# Patient Record
Sex: MALE | Race: OTHER | URBAN - METROPOLITAN AREA
[De-identification: names, ages, dates, MRNs, and addresses within clinical notes are randomized per-mention and may not be internally consistent; named-entity substitution may affect disease eponyms.]

---

## 2019-01-28 PROCEDURE — 81003 URINALYSIS AUTO W/O SCOPE: CPT | Performed by: FAMILY MEDICINE

## 2020-01-24 PROBLEM — G56.03 BILATERAL CARPAL TUNNEL SYNDROME: Status: ACTIVE | Noted: 2020-01-24

## 2021-07-20 PROBLEM — F90.9 ADULT ADHD: Status: ACTIVE | Noted: 2021-07-20

## 2021-07-21 PROBLEM — F17.200 SMOKER: Status: ACTIVE | Noted: 2021-07-21

## 2023-09-18 ENCOUNTER — HOSPITAL ENCOUNTER (OUTPATIENT)
Age: 38
Discharge: HOME | End: 2023-09-18
Payer: MEDICAID

## 2023-09-18 VITALS
TEMPERATURE: 98 F | OXYGEN SATURATION: 100 % | RESPIRATION RATE: 18 BRPM | DIASTOLIC BLOOD PRESSURE: 82 MMHG | HEART RATE: 92 BPM | SYSTOLIC BLOOD PRESSURE: 132 MMHG

## 2023-09-18 DIAGNOSIS — R21 PENILE RASH: Primary | ICD-10-CM

## 2023-09-18 PROCEDURE — 99203 OFFICE O/P NEW LOW 30 MIN: CPT | Performed by: PHYSICIAN ASSISTANT

## 2023-09-18 RX ORDER — ACYCLOVIR 800 MG/1
800 TABLET ORAL 4 TIMES DAILY
Qty: 28 TABLET | Refills: 0 | Status: SHIPPED | OUTPATIENT
Start: 2023-09-18 | End: 2023-09-25

## 2023-09-18 NOTE — DISCHARGE INSTRUCTIONS
YOU REQUESTED EMPIRIC COVERAGE FOR HERPES PENDING RESULTS. STOP IF NEGATIVE RESULT VIA MYCHART. READDRESS as Instructed and refrain from sex practices until cleared by a outpatient clinician.

## 2023-09-19 LAB
C TRACH DNA SPEC QL NAA+PROBE: NEGATIVE
HSV1 DNA SPEC QL NAA+PROBE: NEGATIVE
HSV2 DNA SPEC QL NAA+PROBE: NEGATIVE
N GONORRHOEA DNA SPEC QL NAA+PROBE: NEGATIVE

## 2023-10-15 ENCOUNTER — HOSPITAL ENCOUNTER (OUTPATIENT)
Age: 38
Discharge: HOME | End: 2023-10-15
Payer: MEDICAID

## 2023-10-15 VITALS
DIASTOLIC BLOOD PRESSURE: 83 MMHG | HEART RATE: 115 BPM | TEMPERATURE: 97 F | OXYGEN SATURATION: 100 % | RESPIRATION RATE: 18 BRPM | SYSTOLIC BLOOD PRESSURE: 154 MMHG

## 2023-10-15 DIAGNOSIS — N48.9 PENILE LESION: ICD-10-CM

## 2023-10-15 DIAGNOSIS — R21 PENILE RASH: Primary | ICD-10-CM

## 2023-10-15 PROCEDURE — 99213 OFFICE O/P EST LOW 20 MIN: CPT | Performed by: PHYSICIAN ASSISTANT

## 2023-10-15 RX ORDER — CLOTRIMAZOLE 1 %
1 CREAM (GRAM) TOPICAL 2 TIMES DAILY
Qty: 28 G | Refills: 0 | Status: SHIPPED | OUTPATIENT
Start: 2023-10-15 | End: 2023-10-29

## 2023-10-15 NOTE — ED PROVIDER NOTES
Patient presents with:  Eval-G      HPI:     Mi Hilton is a 34year old male who presents ration of worsening penile rash over the last few months. Notes was seen last month after initial profile done 10 months ago including HIV syphilis all negative. Patient had RSV screened negative. Denies history of HPV. Denies any exposures to specific STD. Monogamous. Notes pain worse with sex. Denies penile discharge scrotal pain fevers chills. [address] [facility] asessment screens reviewed and agree. Nurses notes reviewed I agree with documentation. Family History   Problem Relation Age of Onset    Cancer Maternal Grandfather         not sure what cancer    Cancer Paternal Grandfather         not sure what type of cancer     Family history reviewed with patient/caregiver and is not pertinent to presenting problem.   Social History    Socioeconomic History      Marital status:       Spouse name: Not on file      Number of children: Not on file      Years of education: Not on file      Highest education level: Not on file    Occupational History      Occupation: [employer]    Tobacco Use      Smoking status: Every Day        Packs/day: 1        Types: Cigarettes      Smokeless tobacco: Never    Vaping Use      Vaping Use: Never used    Substance and Sexual Activity      Alcohol use: Yes        Comment: approx 1-3 drinks per week      Drug use: No      Sexual activity: Not on file    Other Topics      Concerns:        Caffeine Concern: No        Exercise: No        Seat Belt: Not Asked        Special Diet: Not Asked        Stress Concern: Not Asked        Weight Concern: Not Asked         Service: Not Asked        Blood Transfusions: Not Asked        Occupational Exposure: Not Asked        Hobby Hazards: Not Asked        Sleep Concern: Not Asked        Back Care: Not Asked        Bike Helmet: Not Asked        Self-Exams: Not Asked    Social History Narrative      Not on file    Social Determinants of Health  Financial Resource Strain: Not on file  Food Insecurity: Not on file  Transportation Needs: Not on file  Physical Activity: Not on file  Stress: Not on file  Social Connections: Not on file  Housing Stability: Not on file      ROS:   Positive for stated complaint: penile rash   All other systems reviewed and negative except as noted above. Constitutional and Vital Signs Reviewed. Physical Exam:     Findings:    /83   Pulse 115   Resp 18   SpO2 100%   GENERAL: well developed, well nourished, well hydrated, no distress  SKIN: good skin turgor, no obvious rashes  EXTREMITIES: no cyanosis or edema. BELTRAN without difficulty  GI/: Scaly raised macular lesions along the penile shaft. Balanitis. No scrotal tenderness or edema. Soft, non-tender, normal bowel sounds  HEAD: normocephalic, atraumatic  EYES: sclera non icteric bilateral, conjunctiva clear  NOSE: nasal turbinates: pink, normal mucosa  NEURO: No focal deficits  PSYCH: Alert and oriented x3. Answering questions appropriately. Mood appropriate. MDM/Assessment/Plan:   Orders for this encounter:    Orders Placed This Encounter      T Pallidum Screening [place]          Order Specific Question: Release to patient          Answer: Immediate      clotrimazole 1 % External Cream          Sig: Apply 1 Application topically 2 (two) times daily for 14 days. Dispense:  28 g          Refill:  0      Labs performed this visit:  No results found for this or any previous visit (from the past 10 hour(s)). MDM:  Patient agrees to readdress for potential chancre by description there is tinea related lesion. Patient agrees to not cover empirically pending results as previously negative and start topical antifungals with precautions and sex and masturbation over the next 10 to 14 days. Instructed on lubricant use when having sex again. Agrees to readdress this formally outpatient. Diagnosis:    ICD-10-CM    1.  Penile rash  R21 T Pallidum Screening [place]     T Pallidum Screening [place]      2. Penile lesion  N48.9           All results reviewed and discussed with patient. See AVS for detailed discharge instructions for your condition today. Follow Up with:  Lisandro Cao MD  [address] [place], [place] [id number]  [phone]    Schedule an appointment as soon as possible for a visit in 1 week  FOLLOW UP AND READDRESS AS DISCUSSED.

## 2023-10-16 LAB — T PALLIDUM AB SER QL: NEGATIVE

## 2023-10-30 ENCOUNTER — HOSPITAL ENCOUNTER (OUTPATIENT)
Age: 38
Discharge: HOME | End: 2023-10-30

## 2023-10-30 VITALS
SYSTOLIC BLOOD PRESSURE: 130 MMHG | OXYGEN SATURATION: 98 % | HEART RATE: 114 BPM | RESPIRATION RATE: 16 BRPM | DIASTOLIC BLOOD PRESSURE: 72 MMHG | TEMPERATURE: 98 F

## 2023-10-30 DIAGNOSIS — R21 PENILE RASH: Primary | ICD-10-CM

## 2023-10-30 PROCEDURE — 99213 OFFICE O/P EST LOW 20 MIN: CPT | Performed by: PHYSICIAN ASSISTANT
